# Patient Record
Sex: FEMALE | Race: BLACK OR AFRICAN AMERICAN | Employment: FULL TIME | URBAN - METROPOLITAN AREA
[De-identification: names, ages, dates, MRNs, and addresses within clinical notes are randomized per-mention and may not be internally consistent; named-entity substitution may affect disease eponyms.]

---

## 2023-01-30 ENCOUNTER — HOSPITAL ENCOUNTER (OUTPATIENT)
Age: 48
Setting detail: OBSERVATION
Discharge: HOME | End: 2023-02-01
Attending: EMERGENCY MEDICINE | Admitting: UROLOGY
Payer: COMMERCIAL

## 2023-01-30 DIAGNOSIS — N20.1 RIGHT URETERAL STONE: Primary | ICD-10-CM

## 2023-01-30 DIAGNOSIS — R52 INTRACTABLE PAIN: ICD-10-CM

## 2023-01-30 LAB
ALBUMIN SERPL-MCNC: 3.7 G/DL (ref 3.5–5)
ALBUMIN/GLOB SERPL: 0.8 (ref 0.4–1.6)
ALP SERPL-CCNC: 65 U/L (ref 50–136)
ALT SERPL-CCNC: 36 U/L (ref 12–65)
ANION GAP SERPL CALC-SCNC: 15 MMOL/L (ref 2–11)
AST SERPL-CCNC: 23 U/L (ref 15–37)
BASOPHILS # BLD: 0.1 K/UL (ref 0–0.2)
BASOPHILS NFR BLD: 1 % (ref 0–2)
BILIRUB SERPL-MCNC: 0.4 MG/DL (ref 0.2–1.1)
BILIRUB UR QL: NEGATIVE
BUN SERPL-MCNC: 6 MG/DL (ref 6–23)
CALCIUM SERPL-MCNC: 9.2 MG/DL (ref 8.3–10.4)
CHLORIDE SERPL-SCNC: 104 MMOL/L (ref 101–110)
CO2 SERPL-SCNC: 19 MMOL/L (ref 21–32)
CREAT SERPL-MCNC: 0.9 MG/DL (ref 0.6–1)
DIFFERENTIAL METHOD BLD: NORMAL
EOSINOPHIL # BLD: 0.4 K/UL (ref 0–0.8)
EOSINOPHIL NFR BLD: 5 % (ref 0.5–7.8)
ERYTHROCYTE [DISTWIDTH] IN BLOOD BY AUTOMATED COUNT: 14.6 % (ref 11.9–14.6)
GLOBULIN SER CALC-MCNC: 4.4 G/DL (ref 2.8–4.5)
GLUCOSE SERPL-MCNC: 99 MG/DL (ref 65–100)
GLUCOSE UR QL STRIP.AUTO: NEGATIVE MG/DL
HCG UR QL: NEGATIVE
HCT VFR BLD AUTO: 41.3 % (ref 35.8–46.3)
HGB BLD-MCNC: 13.4 G/DL (ref 11.7–15.4)
IMM GRANULOCYTES # BLD AUTO: 0 K/UL (ref 0–0.5)
IMM GRANULOCYTES NFR BLD AUTO: 0 % (ref 0–5)
KETONES UR-MCNC: NEGATIVE MG/DL
LEUKOCYTE ESTERASE UR QL STRIP: NEGATIVE
LYMPHOCYTES # BLD: 1.2 K/UL (ref 0.5–4.6)
LYMPHOCYTES NFR BLD: 16 % (ref 13–44)
MCH RBC QN AUTO: 29.4 PG (ref 26.1–32.9)
MCHC RBC AUTO-ENTMCNC: 32.4 G/DL (ref 31.4–35)
MCV RBC AUTO: 90.6 FL (ref 82–102)
MONOCYTES # BLD: 0.7 K/UL (ref 0.1–1.3)
MONOCYTES NFR BLD: 9 % (ref 4–12)
NEUTS SEG # BLD: 5.5 K/UL (ref 1.7–8.2)
NEUTS SEG NFR BLD: 70 % (ref 43–78)
NITRITE UR QL: NEGATIVE
NRBC # BLD: 0 K/UL (ref 0–0.2)
PH UR: 6.5 (ref 5–9)
PLATELET # BLD AUTO: 363 K/UL (ref 150–450)
PMV BLD AUTO: 11.3 FL (ref 9.4–12.3)
POTASSIUM SERPL-SCNC: 4 MMOL/L (ref 3.5–5.1)
PROT SERPL-MCNC: 8.1 G/DL (ref 6.3–8.2)
PROT UR QL: NEGATIVE MG/DL
RBC # BLD AUTO: 4.56 M/UL (ref 4.05–5.2)
RBC # UR STRIP: ABNORMAL
SERVICE CMNT-IMP: ABNORMAL
SODIUM SERPL-SCNC: 138 MMOL/L (ref 133–143)
SP GR UR: 1.02 (ref 1–1.02)
UROBILINOGEN UR QL: 0.2 EU/DL (ref 0.2–1)
WBC # BLD AUTO: 7.8 K/UL (ref 4.3–11.1)

## 2023-01-30 PROCEDURE — 85025 COMPLETE CBC W/AUTO DIFF WBC: CPT

## 2023-01-30 PROCEDURE — 81003 URINALYSIS AUTO W/O SCOPE: CPT

## 2023-01-30 PROCEDURE — 96361 HYDRATE IV INFUSION ADD-ON: CPT

## 2023-01-30 PROCEDURE — G0378 HOSPITAL OBSERVATION PER HR: HCPCS

## 2023-01-30 PROCEDURE — 2700000000 HC OXYGEN THERAPY PER DAY

## 2023-01-30 PROCEDURE — 81025 URINE PREGNANCY TEST: CPT

## 2023-01-30 PROCEDURE — 99222 1ST HOSP IP/OBS MODERATE 55: CPT | Performed by: UROLOGY

## 2023-01-30 PROCEDURE — 6360000002 HC RX W HCPCS: Performed by: EMERGENCY MEDICINE

## 2023-01-30 PROCEDURE — 2580000003 HC RX 258: Performed by: EMERGENCY MEDICINE

## 2023-01-30 PROCEDURE — 80053 COMPREHEN METABOLIC PANEL: CPT

## 2023-01-30 PROCEDURE — 96374 THER/PROPH/DIAG INJ IV PUSH: CPT

## 2023-01-30 PROCEDURE — 99285 EMERGENCY DEPT VISIT HI MDM: CPT

## 2023-01-30 PROCEDURE — 96375 TX/PRO/DX INJ NEW DRUG ADDON: CPT

## 2023-01-30 RX ORDER — KETOROLAC TROMETHAMINE 30 MG/ML
30 INJECTION, SOLUTION INTRAMUSCULAR; INTRAVENOUS
Status: COMPLETED | OUTPATIENT
Start: 2023-01-30 | End: 2023-01-30

## 2023-01-30 RX ORDER — SODIUM CHLORIDE 0.9 % (FLUSH) 0.9 %
5-40 SYRINGE (ML) INJECTION EVERY 12 HOURS SCHEDULED
Status: DISCONTINUED | OUTPATIENT
Start: 2023-01-30 | End: 2023-02-01 | Stop reason: HOSPADM

## 2023-01-30 RX ORDER — ONDANSETRON 8 MG/1
4 TABLET, ORALLY DISINTEGRATING ORAL EVERY 8 HOURS PRN
Status: DISCONTINUED | OUTPATIENT
Start: 2023-01-30 | End: 2023-02-01 | Stop reason: HOSPADM

## 2023-01-30 RX ORDER — POLYETHYLENE GLYCOL 3350 17 G/17G
17 POWDER, FOR SOLUTION ORAL DAILY PRN
Status: DISCONTINUED | OUTPATIENT
Start: 2023-01-30 | End: 2023-02-01 | Stop reason: HOSPADM

## 2023-01-30 RX ORDER — ACETAMINOPHEN 325 MG/1
650 TABLET ORAL
Status: ACTIVE | OUTPATIENT
Start: 2023-01-30 | End: 2023-01-31

## 2023-01-30 RX ORDER — SODIUM CHLORIDE 9 MG/ML
INJECTION, SOLUTION INTRAVENOUS CONTINUOUS
Status: DISCONTINUED | OUTPATIENT
Start: 2023-01-30 | End: 2023-02-01 | Stop reason: HOSPADM

## 2023-01-30 RX ORDER — OXYCODONE HYDROCHLORIDE 5 MG/1
5 TABLET ORAL
Status: COMPLETED | OUTPATIENT
Start: 2023-01-30 | End: 2023-01-31

## 2023-01-30 RX ORDER — ONDANSETRON 2 MG/ML
4 INJECTION INTRAMUSCULAR; INTRAVENOUS EVERY 6 HOURS PRN
Status: DISCONTINUED | OUTPATIENT
Start: 2023-01-30 | End: 2023-02-01 | Stop reason: HOSPADM

## 2023-01-30 RX ORDER — 0.9 % SODIUM CHLORIDE 0.9 %
1000 INTRAVENOUS SOLUTION INTRAVENOUS ONCE
Status: COMPLETED | OUTPATIENT
Start: 2023-01-30 | End: 2023-01-30

## 2023-01-30 RX ORDER — SODIUM CHLORIDE 9 MG/ML
INJECTION, SOLUTION INTRAVENOUS PRN
Status: DISCONTINUED | OUTPATIENT
Start: 2023-01-30 | End: 2023-02-01 | Stop reason: HOSPADM

## 2023-01-30 RX ORDER — SODIUM CHLORIDE 0.9 % (FLUSH) 0.9 %
5-40 SYRINGE (ML) INJECTION PRN
Status: DISCONTINUED | OUTPATIENT
Start: 2023-01-30 | End: 2023-02-01 | Stop reason: HOSPADM

## 2023-01-30 RX ORDER — TAMSULOSIN HYDROCHLORIDE 0.4 MG/1
0.4 CAPSULE ORAL DAILY
Status: DISCONTINUED | OUTPATIENT
Start: 2023-01-31 | End: 2023-02-01 | Stop reason: HOSPADM

## 2023-01-30 RX ORDER — ONDANSETRON 2 MG/ML
4 INJECTION INTRAMUSCULAR; INTRAVENOUS
Status: COMPLETED | OUTPATIENT
Start: 2023-01-30 | End: 2023-01-30

## 2023-01-30 RX ADMIN — SODIUM CHLORIDE 1000 ML: 900 INJECTION, SOLUTION INTRAVENOUS at 16:30

## 2023-01-30 RX ADMIN — ONDANSETRON 4 MG: 2 INJECTION INTRAMUSCULAR; INTRAVENOUS at 15:31

## 2023-01-30 RX ADMIN — KETOROLAC TROMETHAMINE 30 MG: 30 INJECTION, SOLUTION INTRAMUSCULAR at 15:31

## 2023-01-30 ASSESSMENT — ENCOUNTER SYMPTOMS
ABDOMINAL PAIN: 1
NAUSEA: 1
CONSTIPATION: 0
SHORTNESS OF BREATH: 0
VOMITING: 0
COUGH: 0
DIARRHEA: 0
RHINORRHEA: 0

## 2023-01-30 ASSESSMENT — PAIN SCALES - GENERAL
PAINLEVEL_OUTOF10: 10
PAINLEVEL_OUTOF10: 0

## 2023-01-30 ASSESSMENT — LIFESTYLE VARIABLES
HOW OFTEN DO YOU HAVE A DRINK CONTAINING ALCOHOL: 2-3 TIMES A WEEK
HOW MANY STANDARD DRINKS CONTAINING ALCOHOL DO YOU HAVE ON A TYPICAL DAY: 1 OR 2

## 2023-01-30 ASSESSMENT — PAIN - FUNCTIONAL ASSESSMENT: PAIN_FUNCTIONAL_ASSESSMENT: 0-10

## 2023-01-30 ASSESSMENT — PAIN DESCRIPTION - DESCRIPTORS: DESCRIPTORS: SORE;SHARP

## 2023-01-30 ASSESSMENT — PAIN DESCRIPTION - LOCATION: LOCATION: FLANK

## 2023-01-30 ASSESSMENT — PAIN DESCRIPTION - ORIENTATION: ORIENTATION: RIGHT

## 2023-01-30 NOTE — ED PROVIDER NOTES
Emergency Department Provider Note                   PCP:                Miesha Bolanos MD               Age: 41 y.o. Sex: female       ICD-10-CM    1. Right ureteral stone  N20.1       2. Intractable pain  R52           DISPOSITION Decision To Admit 01/30/2023 03:02:46 PM        Medical Decision Making  43-year-old female with history of high parathyroidism presents with complaint of worsening left flank pain since last Monday. Patient was recently evaluated and underwent CT renal stone protocol with evidence of 3 mm right UVJ stone. PCP note from today reviewed. Patient with worsening pain. States she has been compliant with her home pain medication without improvement of symptoms. CT scan with evidence of moderately obstructing 3 mm right UVJ stone. Was prescribed for hydration, Zofran, Toradol. Basic labs ordered and pending at this time. Case discussed with urology NP on-call. States that Dr. Demond Plata to evaluate and likely patient will need to be admitted and have stent placement. Problems Addressed:  Intractable pain: acute illness or injury  Right ureteral stone: complicated acute illness or injury    Amount and/or Complexity of Data Reviewed  Labs: ordered. Risk  Prescription drug management. Decision regarding hospitalization. Complexity of Problem: 1 acute complicated illness or injury. (4)    I have conducted an independent ordering and review of Labs. I have conducted an independent ordering and review of CT Scan. I have reviewed records from an external source: provider visit notes from PCP. Considerations: Shared decision making was utilized in the care of this patient. I discussed disposition with another provider. Patient was admitted I have communication with admitting physician.          Orders Placed This Encounter   Procedures    CMP    CBC with Auto Differential    POCT Urine Dipstick    POC Pregnancy Urine Qual    Saline lock IV        Medications   0.9 % sodium chloride bolus (has no administration in time range)   ketorolac (TORADOL) injection 30 mg (has no administration in time range)   ondansetron (ZOFRAN) injection 4 mg (has no administration in time range)       New Prescriptions    No medications on file        Glenn Mitchell is a 41 y.o. female who presents to the Emergency Department with chief complaint of  No chief complaint on file. 44-year-old female with history of high parathyroidism presents with complaint of worsening left flank pain since last Monday. Patient was recently evaluated and underwent CT renal stone protocol with evidence of 3 mm right UVJ stone. Patient was discharged home with pain control, Flomax. Patient followed up with PCP today and was noted to be having increased pain. Patient received Demerol injection for pain control. Patient was instructed to present to ED for further evaluation and urology consultation. Reports nausea. Denies vomiting, chest pain, shortness of breath, cough, vaginal discharge, vaginal bleeding, pelvic pain. States last menstrual cycle was Wednesday. The history is provided by the patient. No  was used. Review of Systems   Constitutional:  Negative for chills, diaphoresis, fatigue and fever. HENT:  Negative for congestion and rhinorrhea. Respiratory:  Negative for cough and shortness of breath. Cardiovascular:  Negative for chest pain. Gastrointestinal:  Positive for abdominal pain and nausea. Negative for constipation, diarrhea and vomiting. Genitourinary:  Positive for dysuria and flank pain. Negative for hematuria, pelvic pain, vaginal bleeding and vaginal discharge. Musculoskeletal:  Negative for arthralgias and myalgias. Skin:  Negative for rash. Neurological:  Negative for dizziness, weakness, light-headedness and headaches. Hematological:  Does not bruise/bleed easily.      Past Medical History:   Diagnosis Date    Acquired hypothyroidism hyperthyroid    Herpes simplex without mention of complication         Past Surgical History:   Procedure Laterality Date     DELIVERY ONLY          Family History   Problem Relation Age of Onset    Diabetes Maternal Grandmother     Cancer Maternal Grandmother     Heart Disease Maternal Grandfather     Hypertension Paternal Grandfather         Social History     Socioeconomic History    Marital status:    Tobacco Use    Smoking status: Never   Substance and Sexual Activity    Alcohol use: No    Drug use: No         Patient has no known allergies. Previous Medications    AMPICILLIN (PRINCIPEN) 500 MG CAPSULE    Take 500 mg by mouth 4 times daily (before meals and nightly)    HYDROCODONE-ACETAMINOPHEN (NORCO) 5-325 MG PER TABLET    Take 1 tablet by mouth every 8 hours as needed for Pain for up to 4 days. Intended supply: 3 days. Take lowest dose possible to manage pain Max Daily Amount: 3 tablets    TAMSULOSIN (FLOMAX) 0.4 MG CAPSULE    Take 0.4 mg by mouth daily    TRAMADOL (ULTRAM) 50 MG TABLET    Take 50 mg by mouth every 8 hours as needed. Vitals signs and nursing note reviewed. No data found. Physical Exam  Vitals and nursing note reviewed. Constitutional:       Appearance: Normal appearance. HENT:      Head: Normocephalic. Nose: Nose normal.      Mouth/Throat:      Mouth: Mucous membranes are moist.   Eyes:      Extraocular Movements: Extraocular movements intact. Pupils: Pupils are equal, round, and reactive to light. Cardiovascular:      Rate and Rhythm: Normal rate. Pulses: Normal pulses. Heart sounds: Normal heart sounds. Pulmonary:      Effort: Pulmonary effort is normal.      Breath sounds: Normal breath sounds. Abdominal:      General: Bowel sounds are normal. There is no distension. Palpations: Abdomen is soft. Tenderness: There is no abdominal tenderness. There is right CVA tenderness. There is no left CVA tenderness or guarding. Comments: Moderate right CVA tenderness. No rebound or guarding. Musculoskeletal:         General: No swelling. Normal range of motion. Skin:     General: Skin is warm. Findings: No erythema or rash. Neurological:      General: No focal deficit present. Mental Status: She is alert and oriented to person, place, and time. Cranial Nerves: No cranial nerve deficit. Motor: No weakness. Procedures    No results found for any visits on 01/30/23. No orders to display                       Voice dictation software was used during the making of this note. This software is not perfect and grammatical and other typographical errors may be present. This note has not been completely proofread for errors.      Andres Enamorado MD  01/30/23 9920

## 2023-01-31 ENCOUNTER — ANESTHESIA EVENT (OUTPATIENT)
Dept: SURGERY | Age: 48
End: 2023-01-31
Payer: COMMERCIAL

## 2023-01-31 ENCOUNTER — ANESTHESIA (OUTPATIENT)
Dept: SURGERY | Age: 48
End: 2023-01-31
Payer: COMMERCIAL

## 2023-01-31 PROCEDURE — 99232 SBSQ HOSP IP/OBS MODERATE 35: CPT | Performed by: NURSE PRACTITIONER

## 2023-01-31 PROCEDURE — 2709999900 HC NON-CHARGEABLE SUPPLY: Performed by: UROLOGY

## 2023-01-31 PROCEDURE — C2617 STENT, NON-COR, TEM W/O DEL: HCPCS | Performed by: UROLOGY

## 2023-01-31 PROCEDURE — 7100000000 HC PACU RECOVERY - FIRST 15 MIN: Performed by: UROLOGY

## 2023-01-31 PROCEDURE — 6370000000 HC RX 637 (ALT 250 FOR IP): Performed by: UROLOGY

## 2023-01-31 PROCEDURE — 3600000004 HC SURGERY LEVEL 4 BASE: Performed by: UROLOGY

## 2023-01-31 PROCEDURE — 2580000003 HC RX 258: Performed by: NURSE ANESTHETIST, CERTIFIED REGISTERED

## 2023-01-31 PROCEDURE — 96376 TX/PRO/DX INJ SAME DRUG ADON: CPT

## 2023-01-31 PROCEDURE — 7100000001 HC PACU RECOVERY - ADDTL 15 MIN: Performed by: UROLOGY

## 2023-01-31 PROCEDURE — 3700000000 HC ANESTHESIA ATTENDED CARE: Performed by: UROLOGY

## 2023-01-31 PROCEDURE — 2500000003 HC RX 250 WO HCPCS: Performed by: UROLOGY

## 2023-01-31 PROCEDURE — 96375 TX/PRO/DX INJ NEW DRUG ADDON: CPT

## 2023-01-31 PROCEDURE — 52356 CYSTO/URETERO W/LITHOTRIPSY: CPT | Performed by: UROLOGY

## 2023-01-31 PROCEDURE — 6360000002 HC RX W HCPCS: Performed by: UROLOGY

## 2023-01-31 PROCEDURE — 2500000003 HC RX 250 WO HCPCS: Performed by: NURSE ANESTHETIST, CERTIFIED REGISTERED

## 2023-01-31 PROCEDURE — G0378 HOSPITAL OBSERVATION PER HR: HCPCS

## 2023-01-31 PROCEDURE — 3700000001 HC ADD 15 MINUTES (ANESTHESIA): Performed by: UROLOGY

## 2023-01-31 PROCEDURE — 6360000002 HC RX W HCPCS: Performed by: NURSE ANESTHETIST, CERTIFIED REGISTERED

## 2023-01-31 PROCEDURE — 3600000014 HC SURGERY LEVEL 4 ADDTL 15MIN: Performed by: UROLOGY

## 2023-01-31 PROCEDURE — C1769 GUIDE WIRE: HCPCS | Performed by: UROLOGY

## 2023-01-31 PROCEDURE — 6370000000 HC RX 637 (ALT 250 FOR IP): Performed by: NURSE PRACTITIONER

## 2023-01-31 PROCEDURE — 2580000003 HC RX 258: Performed by: UROLOGY

## 2023-01-31 PROCEDURE — 2720000010 HC SURG SUPPLY STERILE: Performed by: UROLOGY

## 2023-01-31 DEVICE — URETERAL STENT
Type: IMPLANTABLE DEVICE | Site: URETER | Status: FUNCTIONAL
Brand: PERCUFLEX™ PLUS

## 2023-01-31 RX ORDER — HYDROCODONE BITARTRATE AND ACETAMINOPHEN 5; 325 MG/1; MG/1
1 TABLET ORAL EVERY 6 HOURS PRN
Qty: 12 TABLET | Refills: 0 | Status: SHIPPED | OUTPATIENT
Start: 2023-01-31 | End: 2023-02-03

## 2023-01-31 RX ORDER — PROPOFOL 10 MG/ML
INJECTION, EMULSION INTRAVENOUS PRN
Status: DISCONTINUED | OUTPATIENT
Start: 2023-01-31 | End: 2023-01-31 | Stop reason: SDUPTHER

## 2023-01-31 RX ORDER — DIPHENHYDRAMINE HYDROCHLORIDE 50 MG/ML
12.5 INJECTION INTRAMUSCULAR; INTRAVENOUS
Status: DISCONTINUED | OUTPATIENT
Start: 2023-01-31 | End: 2023-01-31 | Stop reason: HOSPADM

## 2023-01-31 RX ORDER — DEXMEDETOMIDINE HYDROCHLORIDE 100 UG/ML
INJECTION, SOLUTION INTRAVENOUS PRN
Status: DISCONTINUED | OUTPATIENT
Start: 2023-01-31 | End: 2023-01-31 | Stop reason: SDUPTHER

## 2023-01-31 RX ORDER — FENTANYL CITRATE 50 UG/ML
INJECTION, SOLUTION INTRAMUSCULAR; INTRAVENOUS PRN
Status: DISCONTINUED | OUTPATIENT
Start: 2023-01-31 | End: 2023-01-31 | Stop reason: SDUPTHER

## 2023-01-31 RX ORDER — HYDROMORPHONE HCL 110MG/55ML
0.5 PATIENT CONTROLLED ANALGESIA SYRINGE INTRAVENOUS EVERY 5 MIN PRN
Status: DISCONTINUED | OUTPATIENT
Start: 2023-01-31 | End: 2023-01-31 | Stop reason: HOSPADM

## 2023-01-31 RX ORDER — ONDANSETRON 2 MG/ML
INJECTION INTRAMUSCULAR; INTRAVENOUS PRN
Status: DISCONTINUED | OUTPATIENT
Start: 2023-01-31 | End: 2023-01-31 | Stop reason: SDUPTHER

## 2023-01-31 RX ORDER — PHENAZOPYRIDINE HYDROCHLORIDE 200 MG/1
200 TABLET, FILM COATED ORAL 3 TIMES DAILY PRN
Qty: 30 TABLET | Refills: 3 | Status: SHIPPED | OUTPATIENT
Start: 2023-01-31 | End: 2023-02-03

## 2023-01-31 RX ORDER — LIDOCAINE HYDROCHLORIDE 20 MG/ML
INJECTION, SOLUTION EPIDURAL; INFILTRATION; INTRACAUDAL; PERINEURAL PRN
Status: DISCONTINUED | OUTPATIENT
Start: 2023-01-31 | End: 2023-01-31 | Stop reason: SDUPTHER

## 2023-01-31 RX ORDER — OXYCODONE HYDROCHLORIDE 5 MG/1
5 TABLET ORAL
Status: DISCONTINUED | OUTPATIENT
Start: 2023-01-31 | End: 2023-01-31 | Stop reason: HOSPADM

## 2023-01-31 RX ORDER — SODIUM CHLORIDE, SODIUM LACTATE, POTASSIUM CHLORIDE, CALCIUM CHLORIDE 600; 310; 30; 20 MG/100ML; MG/100ML; MG/100ML; MG/100ML
INJECTION, SOLUTION INTRAVENOUS CONTINUOUS PRN
Status: DISCONTINUED | OUTPATIENT
Start: 2023-01-31 | End: 2023-01-31 | Stop reason: SDUPTHER

## 2023-01-31 RX ORDER — ONDANSETRON 2 MG/ML
4 INJECTION INTRAMUSCULAR; INTRAVENOUS
Status: DISCONTINUED | OUTPATIENT
Start: 2023-01-31 | End: 2023-01-31 | Stop reason: HOSPADM

## 2023-01-31 RX ORDER — BUPIVACAINE HYDROCHLORIDE 5 MG/ML
INJECTION, SOLUTION EPIDURAL; INTRACAUDAL PRN
Status: DISCONTINUED | OUTPATIENT
Start: 2023-01-31 | End: 2023-01-31 | Stop reason: HOSPADM

## 2023-01-31 RX ORDER — OXYCODONE HYDROCHLORIDE 5 MG/1
5 TABLET ORAL EVERY 4 HOURS PRN
Status: DISCONTINUED | OUTPATIENT
Start: 2023-01-31 | End: 2023-02-01 | Stop reason: HOSPADM

## 2023-01-31 RX ORDER — DEXAMETHASONE SODIUM PHOSPHATE 4 MG/ML
INJECTION, SOLUTION INTRA-ARTICULAR; INTRALESIONAL; INTRAMUSCULAR; INTRAVENOUS; SOFT TISSUE PRN
Status: DISCONTINUED | OUTPATIENT
Start: 2023-01-31 | End: 2023-01-31 | Stop reason: SDUPTHER

## 2023-01-31 RX ORDER — ZOLPIDEM TARTRATE 5 MG/1
5 TABLET ORAL NIGHTLY PRN
Status: DISCONTINUED | OUTPATIENT
Start: 2023-01-31 | End: 2023-02-01 | Stop reason: HOSPADM

## 2023-01-31 RX ORDER — KETOROLAC TROMETHAMINE 30 MG/ML
INJECTION, SOLUTION INTRAMUSCULAR; INTRAVENOUS PRN
Status: DISCONTINUED | OUTPATIENT
Start: 2023-01-31 | End: 2023-01-31 | Stop reason: SDUPTHER

## 2023-01-31 RX ADMIN — ONDANSETRON 4 MG: 2 INJECTION INTRAMUSCULAR; INTRAVENOUS at 16:26

## 2023-01-31 RX ADMIN — PROPOFOL 200 MG: 10 INJECTION, EMULSION INTRAVENOUS at 16:12

## 2023-01-31 RX ADMIN — SODIUM CHLORIDE: 9 INJECTION, SOLUTION INTRAVENOUS at 05:16

## 2023-01-31 RX ADMIN — HYDROMORPHONE HYDROCHLORIDE 0.5 MG: 1 INJECTION, SOLUTION INTRAMUSCULAR; INTRAVENOUS; SUBCUTANEOUS at 18:46

## 2023-01-31 RX ADMIN — KETOROLAC TROMETHAMINE 30 MG: 30 INJECTION, SOLUTION INTRAMUSCULAR; INTRAVENOUS at 16:45

## 2023-01-31 RX ADMIN — TAMSULOSIN HYDROCHLORIDE 0.4 MG: 0.4 CAPSULE ORAL at 08:05

## 2023-01-31 RX ADMIN — DEXAMETHASONE SODIUM PHOSPHATE 10 MG: 4 INJECTION, SOLUTION INTRAMUSCULAR; INTRAVENOUS at 16:26

## 2023-01-31 RX ADMIN — LIDOCAINE HYDROCHLORIDE 60 MG: 20 INJECTION, SOLUTION EPIDURAL; INFILTRATION; INTRACAUDAL; PERINEURAL at 16:12

## 2023-01-31 RX ADMIN — FENTANYL CITRATE 50 MCG: 50 INJECTION, SOLUTION INTRAMUSCULAR; INTRAVENOUS at 16:10

## 2023-01-31 RX ADMIN — ZOLPIDEM TARTRATE 5 MG: 5 TABLET ORAL at 22:38

## 2023-01-31 RX ADMIN — SODIUM CHLORIDE: 9 INJECTION, SOLUTION INTRAVENOUS at 00:04

## 2023-01-31 RX ADMIN — OXYCODONE HYDROCHLORIDE 5 MG: 5 TABLET ORAL at 22:38

## 2023-01-31 RX ADMIN — FENTANYL CITRATE 25 MCG: 50 INJECTION, SOLUTION INTRAMUSCULAR; INTRAVENOUS at 16:21

## 2023-01-31 RX ADMIN — SODIUM CHLORIDE, PRESERVATIVE FREE 10 ML: 5 INJECTION INTRAVENOUS at 21:18

## 2023-01-31 RX ADMIN — Medication 2000 MG: at 16:18

## 2023-01-31 RX ADMIN — SODIUM CHLORIDE, SODIUM LACTATE, POTASSIUM CHLORIDE, AND CALCIUM CHLORIDE: 600; 310; 30; 20 INJECTION, SOLUTION INTRAVENOUS at 16:05

## 2023-01-31 RX ADMIN — HYDROMORPHONE HYDROCHLORIDE 0.5 MG: 1 INJECTION, SOLUTION INTRAMUSCULAR; INTRAVENOUS; SUBCUTANEOUS at 06:27

## 2023-01-31 RX ADMIN — OXYCODONE HYDROCHLORIDE 5 MG: 5 TABLET ORAL at 05:13

## 2023-01-31 RX ADMIN — DEXMEDETOMIDINE 40 MCG: 100 INJECTION, SOLUTION, CONCENTRATE INTRAVENOUS at 16:17

## 2023-01-31 RX ADMIN — HYDROMORPHONE HYDROCHLORIDE 0.5 MG: 1 INJECTION, SOLUTION INTRAMUSCULAR; INTRAVENOUS; SUBCUTANEOUS at 01:45

## 2023-01-31 RX ADMIN — FENTANYL CITRATE 25 MCG: 50 INJECTION, SOLUTION INTRAMUSCULAR; INTRAVENOUS at 16:25

## 2023-01-31 RX ADMIN — HYDROMORPHONE HYDROCHLORIDE 0.5 MG: 1 INJECTION, SOLUTION INTRAMUSCULAR; INTRAVENOUS; SUBCUTANEOUS at 11:24

## 2023-01-31 RX ADMIN — HYOSCYAMINE SULFATE 125 MCG: 0.12 TABLET ORAL; SUBLINGUAL at 08:05

## 2023-01-31 RX ADMIN — ONDANSETRON 4 MG: 2 INJECTION INTRAMUSCULAR; INTRAVENOUS at 08:37

## 2023-01-31 RX ADMIN — SODIUM CHLORIDE, PRESERVATIVE FREE 10 ML: 5 INJECTION INTRAVENOUS at 00:03

## 2023-01-31 RX ADMIN — OXYCODONE HYDROCHLORIDE 5 MG: 5 TABLET ORAL at 08:05

## 2023-01-31 ASSESSMENT — PAIN SCALES - GENERAL
PAINLEVEL_OUTOF10: 2
PAINLEVEL_OUTOF10: 8
PAINLEVEL_OUTOF10: 9
PAINLEVEL_OUTOF10: 10
PAINLEVEL_OUTOF10: 8
PAINLEVEL_OUTOF10: 6
PAINLEVEL_OUTOF10: 7
PAINLEVEL_OUTOF10: 6
PAINLEVEL_OUTOF10: 1
PAINLEVEL_OUTOF10: 7

## 2023-01-31 ASSESSMENT — PAIN DESCRIPTION - LOCATION
LOCATION: PELVIS
LOCATION: FLANK
LOCATION: ABDOMEN
LOCATION: ABDOMEN;BACK
LOCATION: ABDOMEN
LOCATION: ABDOMEN

## 2023-01-31 ASSESSMENT — PAIN DESCRIPTION - DESCRIPTORS
DESCRIPTORS: ACHING
DESCRIPTORS: ACHING
DESCRIPTORS: CRAMPING;STABBING
DESCRIPTORS: ACHING;CRAMPING
DESCRIPTORS: ACHING
DESCRIPTORS: CRAMPING;ACHING
DESCRIPTORS: ACHING

## 2023-01-31 ASSESSMENT — PAIN - FUNCTIONAL ASSESSMENT
PAIN_FUNCTIONAL_ASSESSMENT: NONE - DENIES PAIN
PAIN_FUNCTIONAL_ASSESSMENT: ACTIVITIES ARE NOT PREVENTED
PAIN_FUNCTIONAL_ASSESSMENT: ADULT NONVERBAL PAIN SCALE (NPVS)
PAIN_FUNCTIONAL_ASSESSMENT: ACTIVITIES ARE NOT PREVENTED
PAIN_FUNCTIONAL_ASSESSMENT: NONE - DENIES PAIN
PAIN_FUNCTIONAL_ASSESSMENT: ACTIVITIES ARE NOT PREVENTED
PAIN_FUNCTIONAL_ASSESSMENT: 0-10

## 2023-01-31 ASSESSMENT — PAIN DESCRIPTION - ORIENTATION
ORIENTATION: RIGHT
ORIENTATION: RIGHT
ORIENTATION: LOWER
ORIENTATION: RIGHT

## 2023-01-31 NOTE — ANESTHESIA PROCEDURE NOTES
Airway  Date/Time: 1/31/2023 4:14 PM  Urgency: elective    Airway not difficult    General Information and Staff    Patient location during procedure: OR  Resident/CRNA: DANIELLE Haynes - CRNA  Performed: resident/CRNA     Indications and Patient Condition  Indications for airway management: anesthesia  Spontaneous ventilation: present  Sedation level: deep  Preoxygenated: yes  Patient position: sniffing  MILS not maintained throughout  Mask difficulty assessment: vent by bag mask    Final Airway Details  Final airway type: supraglottic airway      Successful airway: oropharyngeal  Size 4     Number of attempts at approach: 1  Ventilation between attempts: supraglottic airway  Number of other approaches attempted: 0    no

## 2023-01-31 NOTE — H&P
Urology H&P:         Patient: Marialuisa Mccracken MRN: [id number]  SSN: [id number]    YOB: 1975  Age: 41 y.o. Sex: female      Subjective:      Marialuisa Mccracken is a 41 y.o. female with history of hyperparathyroidism presents with complaint of worsening left flank pain since last Monday. Patient was recently evaluated at [facility] ER  and underwent CT renal stone protocol with evidence of 3 mm right UVJ stone. PCP note from today reviewed. Patient with worsening pain. States she has been compliant with her home pain medication without improvement of symptoms. CT scan with evidence of moderately obstructing 3 mm right UVJ stone. Was prescribed for hydration, Zofran, Toradol. Basic labs ordered and pending at this time. Case discussed with urology NP on-call. States that Dr. Moises Zamudio to evaluate and likely patient will need to be admitted and have stent placement. .     Past Medical History:   Diagnosis Date    Acquired hypothyroidism     hyperthyroid    Herpes simplex without mention of complication      Past Surgical History:   Procedure Laterality Date     DELIVERY ONLY        Family History   Problem Relation Age of Onset    Diabetes Maternal Grandmother     Cancer Maternal Grandmother     Heart Disease Maternal Grandfather     Hypertension Paternal Grandfather      Social History     Tobacco Use    Smoking status: Never    Smokeless tobacco: Not on file   Substance Use Topics    Alcohol use: No      Prior to Admission medications    Medication Sig Start Date End Date Taking? Authorizing Provider   HYDROcodone-acetaminophen (NORCO) 5-325 MG per tablet Take 1 tablet by mouth every 8 hours as needed for Pain for up to 4 days. Intended supply: 3 days. Take lowest dose possible to manage pain Max Daily Amount: 3 tablets 23  Nicolasa Chadwickr, PA   traMADol (ULTRAM) 50 MG tablet Take 50 mg by mouth every 8 hours as needed.  23   Historical Provider, MD   tamsulosin (FLOMAX) 0.4 MG capsule Take 0.4 mg by mouth daily 1/24/23   Historical Provider, MD   ampicillin (PRINCIPEN) 500 MG capsule Take 500 mg by mouth 4 times daily (before meals and nightly)  Patient not taking: Reported on 1/25/2023    Ar Automatic Reconciliation        No Known Allergies    Review of Systems:  A comprehensive review of systems was negative except for that written in the History of Present Illness. Objective:     Vitals:    01/30/23 1504   BP: (!) 149/97   Pulse: 74   Resp: 18   Temp: 99.1 °F (37.3 °C)   TempSrc: Oral   SpO2: 100%   Weight: 170 lb (77.1 kg)   Height: 5' 5\" (1.651 m)          Physical Exam  General   Mental Status - Patient is alert and oriented X3. Build & Nutrition - Well nourished. Chest and Lung Exam   Chest and lung exam reveals  - normal excursion with symmetric chest walls, quiet, even and easy respiratory effort with no use of accessory muscles and on auscultation, normal breath sounds, no adventitious sounds and normal vocal resonance. Cardiovascular   Cardiovascular examination reveals  - normal heart sounds, regular rate and rhythm with no murmurs. Abdomen   Palpation/Percussion: Palpation and Percussion of the abdomen reveal - Non Tender, No Rebound tenderness, No Rigidity (guarding), No hepatosplenomegaly, No Palpable abdominal masses and Soft. Hernia - Bilateral - No Hernia(s) present. Assessment:     3 mm right distal ureteral stone . Unremitting pain not able to be controlled as outpt . Plan: Will admit for IVF, pain and nausea control. If doesn't pass stone with conservative measures, have discussed right ureteroscopy, laser lithotripsy, stent .     Signed By: Richelle Smith MD     January 30, 2023

## 2023-01-31 NOTE — CARE COORDINATION
CM reviewed patient's chart. Per chart review, patient responded to ER in regards to left flank pain/kidney stone. Per chart, patient insured with PCP listed. No PT/OT consults placed/needed at this time. CM anticipates no needs. Please consult CM for any needs that may arise.       01/31/23 6130   Service Assessment   Patient Orientation Alert and Oriented   Cognition Alert   History Provided By Medical Record   Support Systems Spouse/Significant Other   PCP Verified by CM Yes   Prior Functional Level Independent in ADLs/IADLs   Current Functional Level Independent in ADLs/IADLs   Can patient return to prior living arrangement Yes

## 2023-01-31 NOTE — PROGRESS NOTES
TRANSFER - IN REPORT:    Verbal report received from Jeanna aguirre Darryl Lee  being received from Post op for routine post-op      Report consisted of patient's Situation, Background, Assessment and   Recommendations(SBAR). Information from the following report(s) Nurse Handoff Report was reviewed with the receiving nurse. Opportunity for questions and clarification was provided. Assessment completed upon patient's arrival to unit and care assumed.

## 2023-01-31 NOTE — PROGRESS NOTES
Pt is stable with bed in lowest position, wheels locked, and call light within reach. Hourly rounds completed. VSS. IV is patent and dressing is clean, dry, and intact. Pain treated per MAR. PT urine strained throughout the shift. Pt had complaints of bladder pain this am that she feels has worsened since arrival. Report to be given to day shift nurse.

## 2023-01-31 NOTE — BRIEF OP NOTE
Brief Postoperative Note      Patient: Ricky De Los Santos  YOB: 1975  MRN: [id number]    Date of Procedure: 1/31/2023    Pre-Op Diagnosis: Right ureteral stone [N20.1]    Post-Op Diagnosis: Same       Procedure(s):  CYSTOSCOPY, RIGHT URETEROSCOPY, LASER, LITHO AND STENT    Surgeon(s):  Diogenes Dasilva MD    Assistant:  * No surgical staff found *    Anesthesia: General    Estimated Blood Loss (mL): Minimal    Complications: None    Specimens:   * No specimens in log *    Implants:  Implant Name Type Inv.  Item Serial No.  Lot No. LRB No. Used Action   STENT URET 7FR L24CM PERCFLX + HYDR+ FIRM DUROMETER DB - [id number]  Lindy Villanuevarill 7FR L24CM PERCFLX + HYDR+ FIRM DUROMETER DB  [employer]- [id number] Right 1 Implanted         Drains:   Ureteral Drain/Stent 01/31/23 Right Ureter (Active)       Findings: see op note    Electronically signed by Steven Scott MD on 1/31/2023 at 4:59 PM

## 2023-01-31 NOTE — ANESTHESIA PRE PROCEDURE
Department of Anesthesiology  Preprocedure Note       Name:  Mani Gatica   Age:  41 y.o.  :  1975                                          MRN:  [id number]         Date:  2023      Surgeon: Stephanie Anderson):  Christal Baugh., MD    Procedure: Procedure(s):  CYSTOSCOPY, RIGHT URETEROSCOPY, LASER, LITHO AND STENT    Medications prior to admission:   Prior to Admission medications    Medication Sig Start Date End Date Taking? Authorizing Provider   traMADol (ULTRAM) 50 MG tablet Take 50 mg by mouth every 8 hours as needed.  23   Historical Provider, MD   tamsulosin (FLOMAX) 0.4 MG capsule Take 0.4 mg by mouth daily 23   Historical Provider, MD   ampicillin (PRINCIPEN) 500 MG capsule Take 500 mg by mouth 4 times daily (before meals and nightly)  Patient not taking: No sig reported    Ar Automatic Reconciliation       Current medications:    Current Facility-Administered Medications   Medication Dose Route Frequency Provider Last Rate Last Admin    hyoscyamine (LEVSIN/SL) sublingual tablet 125 mcg  125 mcg SubLINGual Q4H PRN Denise Cha Wall, NP-C   125 mcg at 23 0805    oxyCODONE (ROXICODONE) immediate release tablet 5 mg  5 mg Oral Q4H PRN Denise Cha Wall, NP-C   5 mg at 23 0805    tamsulosin (FLOMAX) capsule 0.4 mg  0.4 mg Oral Daily Christal Seen., MD   0.4 mg at 23 0805    0.9 % sodium chloride infusion   IntraVENous Continuous Christal Seen.,  mL/hr at 23 0516 New Bag at 23 0516    sodium chloride flush 0.9 % injection 5-40 mL  5-40 mL IntraVENous 2 times per day Christal Seen., MD   10 mL at 23 0003    sodium chloride flush 0.9 % injection 5-40 mL  5-40 mL IntraVENous PRN Christal Seen., MD        0.9 % sodium chloride infusion   IntraVENous PRN Christal Seen., MD        ondansetron (ZOFRAN-ODT) disintegrating tablet 4 mg  4 mg Oral Q8H PRN Christal Seen., MD        Or    ondansetron [facility]) injection 4 mg  4 mg IntraVENous Q6H PRN Cindy Mirza MD   4 mg at 23 4538    polyethylene glycol (GLYCOLAX) packet 17 g  17 g Oral Daily PRN Cindy Mirza MD        acetaminophen (TYLENOL) tablet 650 mg  650 mg Oral Once PRN Cindy Mirza MD        HYDROmorphone (DILAUDID) injection 0.5 mg  0.5 mg IntraVENous Q3H PRN Cindy Mirza MD   0.5 mg at 23 1124       Allergies:  No Known Allergies    Problem List:    Patient Active Problem List   Diagnosis Code    Right ureteral stone N20.1       Past Medical History:        Diagnosis Date    Acquired hypothyroidism     hyperthyroid    Herpes simplex without mention of complication        Past Surgical History:        Procedure Laterality Date     DELIVERY ONLY         Social History:    Social History     Tobacco Use    Smoking status: Never    Smokeless tobacco: Not on file   Substance Use Topics    Alcohol use: No                                Counseling given: Not Answered      Vital Signs (Current):   Vitals:    23 1124 23 1125 23 1154 23 1522   BP:  (!) 143/62  133/84   Pulse:  89  84   Resp: 18 16 16 16   Temp:  98.6 °F (37 °C)  98.4 °F (36.9 °C)   TempSrc:  Oral     SpO2:  100%  100%   Weight:       Height:                                                  BP Readings from Last 3 Encounters:   23 133/84   23 (!) 162/79       NPO Status:                                                                                 BMI:   Wt Readings from Last 3 Encounters:   23 170 lb (77.1 kg)   23 176 lb (79.8 kg)     Body mass index is 28.29 kg/m².     CBC:   Lab Results   Component Value Date/Time    WBC 7.8 2023 03:31 PM    RBC 4.56 2023 03:31 PM    HGB 13.4 2023 03:31 PM    HCT 41.3 2023 03:31 PM    MCV 90.6 2023 03:31 PM    RDW 14.6 2023 03:31 PM     2023 03:31 PM       CMP:   Lab Results   Component Value Date/Time     2023 03:31 PM K 4.0 01/30/2023 03:31 PM     01/30/2023 03:31 PM    CO2 19 01/30/2023 03:31 PM    BUN 6 01/30/2023 03:31 PM    CREATININE 0.90 01/30/2023 03:31 PM    LABGLOM >60 01/30/2023 03:31 PM    GLUCOSE 99 01/30/2023 03:31 PM    PROT 8.1 01/30/2023 03:31 PM    CALCIUM 9.2 01/30/2023 03:31 PM    BILITOT 0.4 01/30/2023 03:31 PM    ALKPHOS 65 01/30/2023 03:31 PM    AST 23 01/30/2023 03:31 PM    ALT 36 01/30/2023 03:31 PM       POC Tests: No results for input(s): POCGLU, POCNA, POCK, POCCL, POCBUN, POCHEMO, POCHCT in the last 72 hours. Coags: No results found for: PROTIME, INR, APTT    HCG (If Applicable):   Lab Results   Component Value Date    PREGTESTUR Negative 01/30/2023        ABGs: No results found for: PHART, PO2ART, YPA3OQS, LVQ6QWS, BEART, X8WJPUND     Type & Screen (If Applicable):  No results found for: LABABO, LABRH    Drug/Infectious Status (If Applicable):  No results found for: HIV, HEPCAB    COVID-19 Screening (If Applicable): No results found for: COVID19        Anesthesia Evaluation  Patient summary reviewed and Nursing notes reviewed  Airway: Mallampati: II  TM distance: >3 FB   Neck ROM: full  Mouth opening: > = 3 FB   Dental: normal exam         Pulmonary:Negative Pulmonary ROS and normal exam  breath sounds clear to auscultation                             Cardiovascular:Negative CV ROS  Exercise tolerance: good (>4 METS),           Rhythm: regular  Rate: normal                    Neuro/Psych:   Negative Neuro/Psych ROS              GI/Hepatic/Renal: Neg GI/Hepatic/Renal ROS            Endo/Other:    (+) hypothyroidism, hyperthyroidism::., .                 Abdominal:             Vascular: negative vascular ROS. Other Findings:           Anesthesia Plan      general     ASA 2       Induction: intravenous. Anesthetic plan and risks discussed with patient.                         Rosalie Lim MD   1/31/2023

## 2023-01-31 NOTE — PROGRESS NOTES
Admit Date: 1/30/2023      Subjective:     Niecy pereira with history of hyperparathyroidism presents with complaint of worsening left flank pain since last Monday. Patient was recently evaluated at [facility] ER  and underwent CT renal stone protocol with evidence of 3 mm right UVJ stone. PCP note from today reviewed. Patient with worsening pain. States she has been compliant with her home pain medication without improvement of symptoms. CT scan with evidence of moderately obstructing 3 mm right UVJ stone. Was prescribed for hydration, Zofran, Toradol. 1/31  Complains of pain 8/10 to right flank and bladder. Denies fevers, nausea or vomiting. Objective:     Patient Vitals for the past 8 hrs:   BP Temp Temp src Pulse Resp SpO2   01/31/23 0805 -- -- -- -- 18 --   01/31/23 0735 (!) 141/88 98.4 °F (36.9 °C) Oral 85 16 100 %   01/31/23 0627 -- -- -- -- 19 --   01/31/23 0543 -- -- -- -- 18 --   01/31/23 0513 -- -- -- -- 16 --   01/31/23 0348 130/73 97.5 °F (36.4 °C) Oral 72 16 100 %   01/31/23 0215 -- -- -- -- 18 --   01/31/23 0145 -- -- -- -- 18 --     01/31 0701 - 01/31 1900  In: 1312.5 [P.O.:100; I.V.:1212.5]  Out: 100 [Urine:100]  01/29 1901 - 01/31 0700  In: -   Out: 550 [Urine:550]    Physical Exam:  GENERAL ASSESSMENT: alert, oriented to person, place and time, no acute distress and no anxiety, depression or agitation  Chest: clear to auscultation, no wheezes, rales or rhonchi, symmetric air entry. CVS exam: normal rate, regular rhythm, normal S1, S2, no murmurs, rubs, clicks or gallops. ABDOMEN: tender, soft  Neurological exam reveals alert, oriented, normal speech, no focal findings or movement disorder noted.   FEMALE GENITOURINARY EXAM: not done  MALE GENITAL EXAM: not done    Data Review   Recent Results (from the past 24 hour(s))   CMP    Collection Time: 01/30/23  3:31 PM   Result Value Ref Range    Sodium 138 133 - 143 mmol/L    Potassium 4.0 3.5 - 5.1 mmol/L    Chloride 104 101 - 110 mmol/L CO2 19 (L) 21 - 32 mmol/L    Anion Gap 15 (H) 2 - 11 mmol/L    Glucose 99 65 - 100 mg/dL    BUN 6 6 - 23 MG/DL    Creatinine 0.90 0.6 - 1.0 MG/DL    Est, Glom Filt Rate >60 >60 ml/min/1.73m2    Calcium 9.2 8.3 - 10.4 MG/DL    Total Bilirubin 0.4 0.2 - 1.1 MG/DL    ALT 36 12 - 65 U/L    AST 23 15 - 37 U/L    Alk Phosphatase 65 50 - 136 U/L    Total Protein 8.1 6.3 - 8.2 g/dL    Albumin 3.7 3.5 - 5.0 g/dL    Globulin 4.4 2.8 - 4.5 g/dL    Albumin/Globulin Ratio 0.8 0.4 - 1.6     CBC with Auto Differential    Collection Time: 01/30/23  3:31 PM   Result Value Ref Range    WBC 7.8 4.3 - 11.1 K/uL    RBC 4.56 4.05 - 5.2 M/uL    Hemoglobin 13.4 11.7 - 15.4 g/dL    Hematocrit 41.3 35.8 - 46.3 %    MCV 90.6 82 - 102 FL    MCH 29.4 26.1 - 32.9 PG    MCHC 32.4 31.4 - 35.0 g/dL    RDW 14.6 11.9 - 14.6 %    Platelets 732 312 - 676 K/uL    MPV 11.3 9.4 - 12.3 FL    nRBC 0.00 0.0 - 0.2 K/uL    Differential Type AUTOMATED      Seg Neutrophils 70 43 - 78 %    Lymphocytes 16 13 - 44 %    Monocytes 9 4.0 - 12.0 %    Eosinophils % 5 0.5 - 7.8 %    Basophils 1 0.0 - 2.0 %    Immature Granulocytes 0 0.0 - 5.0 %    Segs Absolute 5.5 1.7 - 8.2 K/UL    Absolute Lymph # 1.2 0.5 - 4.6 K/UL    Absolute Mono # 0.7 0.1 - 1.3 K/UL    Absolute Eos # 0.4 0.0 - 0.8 K/UL    Basophils Absolute 0.1 0.0 - 0.2 K/UL    Absolute Immature Granulocyte 0.0 0.0 - 0.5 K/UL   POCT Urinalysis no Micro    Collection Time: 01/30/23  4:42 PM   Result Value Ref Range    Specific Gravity, Urine, POC 1.025 (H) 1.001 - 1.023      pH, Urine, POC 6.5 5.0 - 9.0      Protein, Urine, POC Negative NEG mg/dL    Glucose, UA POC Negative NEG mg/dL    Ketones, Urine, POC Negative NEG mg/dL    Bilirubin, Urine, POC Negative NEG      Blood, UA POC Trace Intact (A) NEG      URINE UROBILINOGEN POC 0.2 0.2 - 1.0 EU/dL    Nitrate, Urine, POC Negative NEG      Leukocyte Est, UA POC Negative NEG      Performed by: Iris Fagan    POC Pregnancy Urine Qual    Collection Time: 01/30/23 4:49 PM   Result Value Ref Range    Preg Test, Ur Negative NEG         Assessment:     Principal Problem:    Right ureteral stone  Resolved Problems:    * No resolved hospital problems. *      VSS, CR. 0.90, UA neg. CT ABDOMEN PELVIS RENAL STONE  Moderately obstructing 3 mm right UVJ stone. Plan:   Continue IV hydration. Strain urine. Add levsin for bladder spasms. Add PO Roxicodone for pain control. Will schedule right cysto/ureteroscopy lithotripsy today with Dr. Amber Yun. Patient has been NPO. Signed By: JOSE MARTIN Kaiser     January 31, 2023      [facility]  Still having pain requiring narcotics to manage. Plan right URS, laser lithotripsy, stent today. Risks of bleeding, infection, stricture, retained fragments discussed.

## 2023-01-31 NOTE — ANESTHESIA POSTPROCEDURE EVALUATION
Department of Anesthesiology  Postprocedure Note    Patient: Johanna Dumont  MRN: [id number]  YOB: 1975  Date of evaluation: 1/31/2023      Procedure Summary     Date: 01/31/23 Room / Location: [facility] MAIN OR 01 CYSTO / [facility] MAIN OR    Anesthesia Start: 1605 Anesthesia Stop: 0565    Procedure: CYSTOSCOPY, RIGHT URETEROSCOPY, LASER, LITHO AND STENT (Right: Ureter) Diagnosis:       Right ureteral stone      (Right ureteral stone [N20.1])    Providers: Angela Chinchilla MD Responsible Provider: Finesse Dwyer MD    Anesthesia Type: general ASA Status: 2          Anesthesia Type: No value filed.     Ruby Phase I: Ruby Score: 8    Ruby Phase II:        Anesthesia Post Evaluation    Patient location during evaluation: PACU  Patient participation: complete - patient participated  Level of consciousness: awake and alert  Airway patency: patent  Nausea & Vomiting: no nausea and no vomiting  Complications: no  Cardiovascular status: hemodynamically stable  Respiratory status: acceptable, nonlabored ventilation and spontaneous ventilation  Hydration status: euvolemic  Comments: BP (!) 103/59   Pulse 73   Temp 98.3 °F (36.8 °C) (Tympanic)   Resp 15   Ht 5' 5\" (1.651 m)   Wt 170 lb (77.1 kg)   SpO2 100%   BMI 28.29 kg/m²     Multimodal analgesia pain management approach

## 2023-01-31 NOTE — PROGRESS NOTES
Patient awake, alert and oriented to person, place, time and situation. Vitals signs are stable. Hourly rounds completed during this shift. Patient complains of abdomen pain. Patient medicated as ordered. Patient returned from OR. IV fluids restarted. Family member remains at the bedside. Bed in low position and wheels locked, safety precautions maintained, call bell in reach and personal items are in reach. Will continue to monitor patient. Report to be given to night shift nurse.

## 2023-01-31 NOTE — PERIOP NOTE
TRANSFER - OUT REPORT:    Verbal report given to [email or web address] on Mani Gatica  being transferred to [address] [place] [address] for routine progression of patient care       Report consisted of patients Situation, Background, Assessment and   Recommendations(SBAR). Information from the following report(s) Nurse Handoff Report, Surgery Report, Intake/Output, MAR, Recent Results, and Cardiac Rhythm NSR  was reviewed with the receiving nurse. Lines:   Peripheral IV 01/31/23 Right;Lateral Wrist (Active)        Opportunity for questions and clarification was provided. Patient transported with:   O2 @ 2 liters  Tech    VTE prophylaxis orders have been written for Mani Gatica. Patient and family given floor number and nurses name. Family updated re: pt status after security code verified.

## 2023-01-31 NOTE — PROGRESS NOTES
Attempted Spiritual Consult for [facility], [facility]. POA. [address] [place] [address] reviewed chart to deterimine PT's ability to make decisions. [address] [place] [address] attempted to visit PT. PT was being transported for surgery as [address] [place] [address] arrived. [address] [place] [address] read in chart that PT is Tj. [address] [place] [address] prayed silently for PT, PT's Family, and Staff. Rev. CORTNEY QuezadaDiv.

## 2023-01-31 NOTE — ED NOTES
TRANSFER - OUT REPORT:    Verbal report given to Carolyn Mayberry RN on Adam No  being transferred to [phone] for routine progression of patient care       Report consisted of patient's Situation, Background, Assessment and   Recommendations(SBAR). Information from the following report(s) ED SBAR was reviewed with the receiving nurse. [place] Assessment: Presents to emergency department  because of falls (Syncope, seizure, or loss of consciousness): No, Age > 79: No, Altered Mental Status, Intoxication with alcohol or substance confusion (Disorientation, impaired judgment, poor safety awaremess, or inability to follow instructions): No, Impaired Mobility: Ambulates or transfers with assistive devices or assistance; Unable to ambulate or transer.: No, Nursing Judgement: Yes  Lines:   Peripheral IV 01/30/23 Right Antecubital (Active)       Peripheral IV 01/30/23 Right Antecubital (Active)        Opportunity for questions and clarification was provided.       Patient transported with:  Calvin Weiss RN  01/30/23 5554

## 2023-02-01 VITALS
TEMPERATURE: 98.4 F | HEART RATE: 68 BPM | BODY MASS INDEX: 28.32 KG/M2 | HEIGHT: 65 IN | RESPIRATION RATE: 18 BRPM | SYSTOLIC BLOOD PRESSURE: 131 MMHG | DIASTOLIC BLOOD PRESSURE: 77 MMHG | OXYGEN SATURATION: 98 % | WEIGHT: 170 LBS

## 2023-02-01 PROBLEM — N20.1 RIGHT URETERAL STONE: Status: RESOLVED | Noted: 2023-01-30 | Resolved: 2023-02-01

## 2023-02-01 PROCEDURE — 99231 SBSQ HOSP IP/OBS SF/LOW 25: CPT | Performed by: NURSE PRACTITIONER

## 2023-02-01 PROCEDURE — 6370000000 HC RX 637 (ALT 250 FOR IP): Performed by: UROLOGY

## 2023-02-01 PROCEDURE — G0378 HOSPITAL OBSERVATION PER HR: HCPCS

## 2023-02-01 PROCEDURE — 2580000003 HC RX 258: Performed by: UROLOGY

## 2023-02-01 PROCEDURE — 6360000002 HC RX W HCPCS: Performed by: UROLOGY

## 2023-02-01 RX ADMIN — HYDROMORPHONE HYDROCHLORIDE 0.5 MG: 1 INJECTION, SOLUTION INTRAMUSCULAR; INTRAVENOUS; SUBCUTANEOUS at 08:37

## 2023-02-01 RX ADMIN — SODIUM CHLORIDE, PRESERVATIVE FREE 10 ML: 5 INJECTION INTRAVENOUS at 08:38

## 2023-02-01 RX ADMIN — TAMSULOSIN HYDROCHLORIDE 0.4 MG: 0.4 CAPSULE ORAL at 08:37

## 2023-02-01 RX ADMIN — POLYETHYLENE GLYCOL 3350 17 G: 17 POWDER, FOR SOLUTION ORAL at 04:29

## 2023-02-01 ASSESSMENT — PAIN DESCRIPTION - LOCATION: LOCATION: ABDOMEN

## 2023-02-01 ASSESSMENT — PAIN SCALES - GENERAL
PAINLEVEL_OUTOF10: 3
PAINLEVEL_OUTOF10: 7

## 2023-02-01 ASSESSMENT — PAIN DESCRIPTION - DESCRIPTORS: DESCRIPTORS: ACHING

## 2023-02-01 ASSESSMENT — PAIN - FUNCTIONAL ASSESSMENT: PAIN_FUNCTIONAL_ASSESSMENT: ACTIVITIES ARE NOT PREVENTED

## 2023-02-01 ASSESSMENT — PAIN DESCRIPTION - ORIENTATION: ORIENTATION: LOWER

## 2023-02-01 NOTE — PROGRESS NOTES
Pt to be d/c per MD order. Pt educated on d/c, verbalizes understanding. PIV removed without complication. Yes

## 2023-02-01 NOTE — OP NOTE
[facility]  OPERATIVE REPORT    Name:  Isidro Gillespie  MR#:  [id number]  :  1975  ACCOUNT #:  [de-identified]  DATE OF SERVICE:  2023    PREOPERATIVE DIAGNOSIS:  Right ureteral calculus. POSTOPERATIVE DIAGNOSIS:  Right ureteral calculus. PROCEDURE PERFORMED:  Right ureteroscopy with holmium laser lithotripsy of ureteral calculus and placement of right double-J ureteral stent. SURGEON:  Hipolito Fenton MD    ASSISTANT:  None. ANESTHESIA:  General.    COMPLICATIONS:  None. SPECIMENS REMOVED:  None. IMPLANTS:  Right ureteral stent. ESTIMATED BLOOD LOSS:  None. FINDINGS:  A 3 mm stone impacted in the right intramural ureter. BRIEF DESCRIPTION OF PROCEDURE:  The patient was given a general anesthetic, placed in dorsal lithotomy position. She was prepped and draped in a sterile fashion. I passed a 22-Citizen of the [place] cystoscope into the urethra using the video camera and 30-degree lens. The bladder showed no mucosal lesions. There was edema surrounding the right ureteral orifice. I passed a 0.038 floppy-tip guidewire into the right ureter and passed it up into the kidney using fluoroscopic guidance. The 6.5-Citizen of the [place] semi-rigid ureteroscope was then passed into the ureter. There was a 3 mm stone in the right intramural ureter. A 365 micron holmium laser fiber was used at a setting of 0.5 J and 5 Hz. The stone was completely fragmented. The ureter was very hydronephrotic, and I passed the scope up into the proximal ureter without any further stones being noted. At this point, the wire was backloaded into the cystoscope and we passed a 7-Citizen of the [place] 24 cm long double-J stent successfully and left it in a good position. Suture was left attached to the distal end of the stent. This was left protruding from the urethral meatus, and we instilled Marcaine into the bladder prior to removing the scope. PLAN:  She will be discharged home.   Return to the office in one week for KUB and stent removal.      MD JOSUÉ [id number]  D:  01/31/2023 17:09  T:  01/31/2023 19:19  JOB #:  [id number]

## 2023-02-01 NOTE — DISCHARGE SUMMARY
Discharge Summary    Date: 2/1/2023  Patient Name: Marii Gold    YOB: 1975     Age: 41 y.o. Admit Date: 1/30/2023  Discharge Date: 2/1/2023  Discharge Condition: Stable    Admission Diagnosis  Ureteral stone [N20.1]; Right ureteral stone [N20.1]; Intractable pain [R52]      Discharge Diagnosis  Principal Problem (Resolved):    Right ureteral stone  Active Problems:    * No active hospital problems. [facility] Stay  Narrative of Hospital Course:  Patient Active Problem List:  (none) - all problems resolved or deleted       Consultants:  IP CONSULT TO [facility]    Surgeries/procedures Performed:  Right ureteroscopy lithotripsy stent placement. Treatments:    Analgesia and Surgery    Dilaudid    Discharge Plan/Disposition:  Home    Hospital/Incidental Findings Requiring Follow Up:    Patient Instructions:    Diet:    Activity:Activity as Tolerated and No Driving While on Analgesics  For number of days (if applicable): Other Instructions:    Provider Follow-Up:   No follow-ups on file.      Significant Diagnostic Studies:    Recent Labs:  Admission on 01/30/2023  Sodium                                        Date: 01/30/2023  Value: 138         Ref range: 133 - 143 mmol/L   Status: Final  Potassium                                     Date: 01/30/2023  Value: 4.0         Ref range: 3.5 - 5.1 mmol/L   Status: Final  Chloride                                      Date: 01/30/2023  Value: 104         Ref range: 101 - 110 mmol/L   Status: Final  CO2                                           Date: 01/30/2023  Value: 19 (A)      Ref range: 21 - 32 mmol/L     Status: Final  Anion Gap                                     Date: 01/30/2023  Value: 15 (A)      Ref range: 2 - 11 mmol/L      Status: Final  Glucose                                       Date: 01/30/2023  Value: 99          Ref range: 65 - 100 mg/dL     Status: Final  BUN                                           Date: 01/30/2023  Value: 6           Ref range: 6 - 23 MG/DL       Status: Final  Creatinine                                    Date: 01/30/2023  Value: 0.90        Ref range: 0.6 - 1.0 MG/DL    Status: Final  EstTree Filt Rate                           Date: 01/30/2023  Value: >60         Ref range: >60 ml/min/1.73m2  Status: Final                Comment:      Pediatric calculator link: [employer]. org/professionals/kdoqi/gfr_calculatorped       These results are not intended for use in patients <25years of age. eGFR results are calculated without a race factor using  the 2021 CKD-EPI equation. Careful clinical correlation is recommended, particularly when comparing to results calculated using previous equations. The CKD-EPI equation is less accurate in patients with extremes of muscle mass, extra-renal metabolism of creatinine, excessive creatine ingestion, or following therapy that affects renal tubular secretion.     Calcium                                       Date: 01/30/2023  Value: 9.2         Ref range: 8.3 - 10.4 MG/DL   Status: Final  Total Bilirubin                               Date: 01/30/2023  Value: 0.4         Ref range: 0.2 - 1.1 MG/DL    Status: Final  ALT                                           Date: 01/30/2023  Value: 36          Ref range: 12 - 65 U/L        Status: Final  AST                                           Date: 01/30/2023  Value: 23          Ref range: 15 - 37 U/L        Status: Final  Alk Phosphatase                               Date: 01/30/2023  Value: 65          Ref range: 50 - 136 U/L       Status: Final  Total Protein                                 Date: 01/30/2023  Value: 8.1         Ref range: 6.3 - 8.2 g/dL     Status: Final  Albumin                                       Date: 01/30/2023  Value: 3.7         Ref range: 3.5 - 5.0 g/dL     Status: Final  Globulin                                      Date: 01/30/2023  Value: 4.4         Ref range: 2.8 - 4.5 g/dL Status: Final  Albumin/Globulin Ratio                        Date: 01/30/2023  Value: 0.8         Ref range: 0.4 - 1.6          Status: Final  WBC                                           Date: 01/30/2023  Value: 7.8         Ref range: 4.3 - 11.1 K/uL    Status: Final  RBC                                           Date: 01/30/2023  Value: 4.56        Ref range: 4.05 - 5.2 M/uL    Status: Final  Hemoglobin                                    Date: 01/30/2023  Value: 13.4        Ref range: 11.7 - 15.4 g/dL   Status: Final  Hematocrit                                    Date: 01/30/2023  Value: 41.3        Ref range: 35.8 - 46.3 %      Status: Final  MCV                                           Date: 01/30/2023  Value: 90.6        Ref range: 82 - 102 FL        Status: Final  MCH                                           Date: 01/30/2023  Value: 29.4        Ref range: 26.1 - 32.9 PG     Status: Final  MCHC                                          Date: 01/30/2023  Value: 32.4        Ref range: 31.4 - 35.0 g/dL   Status: Final  RDW                                           Date: 01/30/2023  Value: 14.6        Ref range: 11.9 - 14.6 %      Status: Final  Platelets                                     Date: 01/30/2023  Value: 363         Ref range: 150 - 450 K/uL     Status: Final  MPV                                           Date: 01/30/2023  Value: 11.3        Ref range: 9.4 - 12.3 FL      Status: Final  nRBC                                          Date: 01/30/2023  Value: 0.00        Ref range: 0.0 - 0.2 K/uL     Status: Final                Comment: **Note: Absolute NRBC parameter is now reported with Hemogram**  Differential Type                             Date: 01/30/2023  Value: AUTOMATED   Ref range:                    Status: Final  Seg Neutrophils                               Date: 01/30/2023  Value: 70          Ref range: 43 - 78 %          Status: Final  Lymphocytes Date: 01/30/2023  Value: 16          Ref range: 13 - 44 %          Status: Final  Monocytes                                     Date: 01/30/2023  Value: 9           Ref range: 4.0 - 12.0 %       Status: Final  Eosinophils %                                 Date: 01/30/2023  Value: 5           Ref range: 0.5 - 7.8 %        Status: Final  Basophils                                     Date: 01/30/2023  Value: 1           Ref range: 0.0 - 2.0 %        Status: Final  Immature Granulocytes                         Date: 01/30/2023  Value: 0           Ref range: 0.0 - 5.0 %        Status: Final  Segs Absolute                                 Date: 01/30/2023  Value: 5.5         Ref range: 1.7 - 8.2 K/UL     Status: Final  Absolute Lymph #                              Date: 01/30/2023  Value: 1.2         Ref range: 0.5 - 4.6 K/UL     Status: Final  Absolute Mono #                               Date: 01/30/2023  Value: 0.7         Ref range: 0.1 - 1.3 K/UL     Status: Final  Absolute Eos #                                Date: 01/30/2023  Value: 0.4         Ref range: 0.0 - 0.8 K/UL     Status: Final  Basophils Absolute                            Date: 01/30/2023  Value: 0.1         Ref range: 0.0 - 0.2 K/UL     Status: Final  Absolute Immature Granulocyte                 Date: 01/30/2023  Value: 0.0         Ref range: 0.0 - 0.5 K/UL     Status: Final  Specific Gravity, Urine, POC                  Date: 01/30/2023  Value: 1.025 (A)   Ref range: 1.001 - 1.023      Status: Final  pH, Urine, POC                                Date: 01/30/2023  Value: 6.5         Ref range: 5.0 - 9.0          Status: Final  Protein, Urine, POC                           Date: 01/30/2023  Value: Negative    Ref range: NEG mg/dL          Status: Final  Glucose, UA POC                               Date: 01/30/2023  Value: Negative    Ref range: NEG mg/dL          Status: Final  Ketones, Urine, POC                           Date: 01/30/2023  Value: Negative    Ref range: NEG mg/dL          Status: Final  Bilirubin, Urine, POC                         Date: 01/30/2023  Value: Negative    Ref range: NEG                Status: Final  Blood, UA POC                                 Date: 01/30/2023  Value: Trace Intact                     Ref range: NEG                Status: Final  URINE UROBILINOGEN POC                        Date: 01/30/2023  Value: 0.2         Ref range: 0.2 - 1.0 EU/dL    Status: Final  Nitrate, Urine, POC                           Date: 01/30/2023  Value: Negative    Ref range: NEG                Status: Final  Leukocyte Est, UA POC                         Date: 01/30/2023  Value: Negative    Ref range: NEG                Status: Final  Performed by:                                 Date: 01/30/2023  Value: Earnie Bun                       Status: Final  Preg Test, Ur                                 Date: 01/30/2023  Value: Negative    Ref range: NEG                Status: Final  ------------    Radiology last 7 days:  CT ABDOMEN PELVIS RENAL STONE    Result Date: 1/26/2023  Moderately obstructing 3 mm right UVJ stone. [unfilled]    Discharge Medications    Current Discharge Medication List    START taking these medications    phenazopyridine (PYRIDIUM) 200 MG tablet  Take 1 tablet by mouth 3 times daily as needed for Pain  Qty: 30 tablet Refills: 3          Current Discharge Medication List    CONTINUE these medications which have CHANGED    HYDROcodone-acetaminophen (NORCO) 5-325 MG per tablet  Take 1 tablet by mouth every 6 hours as needed for Pain for up to 3 days. Intended supply: 3 days.  Take lowest dose possible to manage pain Max Daily Amount: 4 tablets  Qty: 12 tablet Refills: 0  Comments: Reduce doses taken as pain becomes manageable  Associated Diagnoses:Right ureteral stone          Current Discharge Medication List    CONTINUE these medications which have NOT CHANGED    traMADol (ULTRAM) 50 MG tablet  Take 50 mg by mouth every 8 hours as needed. tamsulosin (FLOMAX) 0.4 MG capsule  Take 0.4 mg by mouth daily    ampicillin (PRINCIPEN) 500 MG capsule  Take 500 mg by mouth 4 times daily (before meals and nightly)          Current Discharge Medication List        Time Spent on Discharge:  minutes were spent in patient examination, evaluation, counseling as well as medication reconciliation, prescriptions for required medications, discharge plan, and follow up.     Electronically signed by JOSE MARTIN Hanson on 2/1/23 at 8:21 AM EST

## 2023-02-01 NOTE — PROGRESS NOTES
Admit Date: 1/30/2023      Subjective:     Omar Hawkins is POD  1Procedure(s):  CYSTOSCOPY, RIGHT URETEROSCOPY, LASER, LITHO AND STENT    Reports feeling much better. Minimal pain. Reports some bladder spasms. Objective:     Patient Vitals for the past 8 hrs:   BP Temp Temp src Pulse Resp SpO2   02/01/23 0749 131/77 98.4 °F (36.9 °C) Oral 68 17 98 %   02/01/23 0344 (!) 117/59 97.3 °F (36.3 °C) Oral 77 16 96 %     No intake/output data recorded. 01/30 1901 - 02/01 0700  In: 2112.5 [P.O.:100; I.V.:2012.5]  Out: 2700 [Urine:2700]    Physical Exam:  GENERAL ASSESSMENT: alert, oriented to person, place and time, no acute distress and no anxiety, depression or agitation  Chest: clear to auscultation, no wheezes, rales or rhonchi, symmetric air entry. CVS exam: normal rate, regular rhythm, normal S1, S2, no murmurs, rubs, clicks or gallops. ABDOMEN: not done  Neurological exam reveals alert, oriented, normal speech, no focal findings or movement disorder noted. FEMALE GENITOURINARY EXAM: not done  MALE GENITAL EXAM: not done    Data Review No results found for this or any previous visit (from the past 24 hour(s)). Assessment:     Principal Problem:    Right ureteral stone  Resolved Problems:    * No resolved hospital problems. *    VSS, Cr. 0.9. Voiding spontaneously. Pre-Op Diagnosis: Right ureteral stone [N20.1]    Post-Op Diagnosis:  * No post-op diagnosis entered *    Procedures: Procedure(s):  S/P CYSTOSCOPY, RIGHT URETEROSCOPY, LASER, LITHO AND STENT      Plan:     D/C home today. F/U in OP in 1 week for KUB and stent removal. Office will call with Date and time. Signed By: JOSE MARTIN Maier     February 1, 2023      [facility]    I have reviewed the above note and examined the patient. I agree with the exam, assessment and plan.     Jaleesa Hopkins MD

## 2023-02-03 ENCOUNTER — TELEPHONE (OUTPATIENT)
Dept: UROLOGY | Age: 48
End: 2023-02-03

## 2023-02-03 DIAGNOSIS — N20.1 URETERAL STONE: Primary | ICD-10-CM

## 2023-02-03 RX ORDER — HYDROCODONE BITARTRATE AND ACETAMINOPHEN 5; 325 MG/1; MG/1
1 TABLET ORAL EVERY 6 HOURS PRN
Qty: 12 TABLET | Refills: 0 | Status: SHIPPED | OUTPATIENT
Start: 2023-02-03 | End: 2023-02-06

## 2023-02-03 NOTE — TELEPHONE ENCOUNTER
Pt called and states that her [facility] not [facility] [address] //dh

## 2023-02-03 NOTE — TELEPHONE ENCOUNTER
Diagnosis Orders   1.  Ureteral stone  HYDROcodone-acetaminophen (NORCO) 5-325 MG per tablet        I eprescribed the med

## 2023-02-08 ENCOUNTER — PROCEDURE VISIT (OUTPATIENT)
Dept: UROLOGY | Age: 48
End: 2023-02-08

## 2023-02-08 ENCOUNTER — OFFICE VISIT (OUTPATIENT)
Dept: UROLOGY | Age: 48
End: 2023-02-08

## 2023-02-08 DIAGNOSIS — N20.0 RENAL STONE: ICD-10-CM

## 2023-02-08 DIAGNOSIS — N20.1 RIGHT URETERAL STONE: Primary | ICD-10-CM

## 2023-02-08 DIAGNOSIS — N30.00 ACUTE CYSTITIS WITHOUT HEMATURIA: ICD-10-CM

## 2023-02-08 LAB
BILIRUBIN, URINE, POC: NEGATIVE
BLOOD URINE, POC: NORMAL
GLUCOSE URINE, POC: 100
KETONES, URINE, POC: NEGATIVE
LEUKOCYTE ESTERASE, URINE, POC: NORMAL
NITRITE, URINE, POC: POSITIVE
PH, URINE, POC: 5 (ref 4.6–8)
PROTEIN,URINE, POC: 100
SPECIFIC GRAVITY, URINE, POC: 1.03 (ref 1–1.03)
URINALYSIS CLARITY, POC: NORMAL
URINALYSIS COLOR, POC: NORMAL
UROBILINOGEN, POC: NORMAL

## 2023-02-08 RX ORDER — NITROFURANTOIN 25; 75 MG/1; MG/1
100 CAPSULE ORAL 2 TIMES DAILY
Qty: 14 CAPSULE | Refills: 0 | Status: SHIPPED | OUTPATIENT
Start: 2023-02-08 | End: 2023-02-15

## 2023-02-08 RX ORDER — SULFAMETHOXAZOLE AND TRIMETHOPRIM 800; 160 MG/1; MG/1
1 TABLET ORAL 2 TIMES DAILY
Qty: 14 TABLET | Refills: 0 | Status: CANCELLED | OUTPATIENT
Start: 2023-02-08 | End: 2023-02-15

## 2023-02-08 ASSESSMENT — ENCOUNTER SYMPTOMS
EYE DISCHARGE: 0
SHORTNESS OF BREATH: 0
BACK PAIN: 1
CONSTIPATION: 1
ABDOMINAL PAIN: 1
WHEEZING: 0
HEARTBURN: 1
DIARRHEA: 0
SKIN LESIONS: 0
VOMITING: 0
BLOOD IN STOOL: 0
INDIGESTION: 0
COUGH: 0
EYE PAIN: 0
NAUSEA: 1

## 2023-02-08 NOTE — PROGRESS NOTES
[facility]  [address] [facility]   [address] Jenni Jarrell  [facility], [address] [facility]  [phone]    Angi Levine  : 1975         HPI   41 y.o. female who presents for cystoscopy with R ureter stent removal.     She is s/p R URS/LL with Dr. Amara Tan on 23. Has expected stent discomfort. Strings not visible for removal today. Therefore cysto recommended. Past Medical History:   Diagnosis Date    Acquired hypothyroidism     hyperthyroid    Herpes simplex without mention of complication      Past Surgical History:   Procedure Laterality Date    BLADDER SURGERY Right 2023    CYSTOSCOPY, RIGHT URETEROSCOPY, LASER, LITHO AND STENT performed by Aspen Ramírez MD at [facility] MAIN OR     DELIVERY ONLY       Current Outpatient Medications   Medication Sig Dispense Refill    nitrofurantoin, macrocrystal-monohydrate, (MACROBID) 100 MG capsule Take 1 capsule by mouth 2 times daily for 7 days 14 capsule 0    traMADol (ULTRAM) 50 MG tablet Take 50 mg by mouth every 8 hours as needed. tamsulosin (FLOMAX) 0.4 MG capsule Take 0.4 mg by mouth daily       No current facility-administered medications for this visit.      No Known Allergies  Social History     Socioeconomic History    Marital status:      Spouse name: Not on file    Number of children: Not on file    Years of education: Not on file    Highest education level: Not on file   Occupational History    Not on file   Tobacco Use    Smoking status: Never    Smokeless tobacco: Not on file   Substance and Sexual Activity    Alcohol use: No    Drug use: No    Sexual activity: Not on file   Other Topics Concern    Not on file   Social History Narrative    Not on file     Social Determinants of Health     Financial Resource Strain: Not on file   Food Insecurity: Not on file   Transportation Needs: Not on file   Physical Activity: Not on file   Stress: Not on file   Social Connections: Not on file   Intimate Partner Violence: Not on file Housing Stability: Not on file     Family History   Problem Relation Age of Onset    Diabetes Maternal Grandmother     Cancer Maternal Grandmother     Heart Disease Maternal Grandfather     Hypertension Paternal Grandfather        There were no vitals taken for this visit. UA - Dipstick  Results for orders placed or performed in visit on 02/08/23   AMB POC URINALYSIS DIP STICK AUTO W/O MICRO   Result Value Ref Range    Color (UA POC)      Clarity (UA POC)      Glucose, Urine,  Negative    Bilirubin, Urine, POC Negative Negative    KETONES, Urine, POC Negative Negative    Specific Gravity, Urine, POC 1.030 1.001 - 1.035    Blood (UA POC) Moderate Negative    pH, Urine, POC 5.0 4.6 - 8.0    Protein, Urine,  Negative    Urobilinogen, POC 1 mg/dL     Nitrite, Urine, POC Positive Negative    Leukocyte Esterase, Urine, POC Large Negative       UA - Micro  WBC - 0  RBC - 0  Bacteria - 0  Epith - 0    There were no vitals taken for this visit. GENERAL: No acute distress, Awake, Alert, Oriented X 3, Gait normal  CARDIAC: regular rate and rhythm  CHEST AND LUNG: Easy work of breathing, clear to auscultation bilaterally, no cyanosis  ABDOMEN: soft, non tender, non-distended, positive bowel sounds, no organomegaly, no palpable masses, no guarding, no rebound tenderness  SKIN: No rash, no erythema, no lacerations or abrasions, no ecchymosis  NEUROLOGIC: cranial nerves 2-12 grossly intact       Cystoscopy Procedure:     Procedure Room  1  Scope ID:       DISPO  Assistant:      AUTUMN    All risks, benefits and alternatives were again reviewed with patient and she is willing to proceed at this time. Her genital area was prepped and draped and a sterile field applied. 2% lidocaine jelly was injected in the the urethra and allowed to dwell for several minutes. A flexible cystoscope was then inserted into the urethral meatus and advanced under direct vision. The urethra appeared normal with no obstructions.   The bladder neck was open without scarring, contractures, frons or tumors present. The bladder was systematically surveyed. No bladder trabeculations were seen. No mucosal abnormalities were seen. The ureteral orifices were seen in their normal orthotopic position. Stent seen from R UO. Stent grasper inserted and stent grasped / removed. The cystoscope was then removed under direct vision. The patient tolerated the procedure well. Assessment and Plan    ICD-10-CM    1. Right ureteral stone  N20.1 VT CYSTO W/SIMPLE REMOVAL STONE & STENT        Orders Placed This Encounter   Procedures    VT CYSTO W/SIMPLE REMOVAL STONE & STENT     Right ureter stent removed today completely intact. Patient tolerated well. Follow up PRN    I have educated pt. about diet modifications that can decrease the risk of future calcium stone formation. These include decreasing things high in oxalate such as teas and dillon. Also, I have encouraged pt. to increase clear liquid intake, especially H2O, and decrease dietary sodium intake. Things high in citrate such as lemon juice should also be increased. Jak Starkey M.D.     [facility]  [address]  Phone: [phone]  Fax: [phone]

## 2023-02-08 NOTE — LETTER
[facility]  [address] [facility] [address]  Phone: [phone]  Fax: [phone]    Brigitte Herrmann MD    February 10, 2023     Pema Gregorio, [address] [place] [address]    Patient: Desean Trevizo   MR Number: [id number]   YOB: 1975   Date of Visit: 2/8/2023       Dear Pema Gregorio: Thank you for referring Anand Interiano to me for evaluation/treatment. Below are the relevant portions of my assessment and plan of care. If you have questions, please do not hesitate to call me. I look forward to following Dean Mcdonnell along with you.     Sincerely,      Brigitte Herrmann MD

## 2023-02-08 NOTE — PROGRESS NOTES
[facility]  [address] [facility] [address], [address] [facility]  Kimberlyn Douglas  : 1975    Chief Complaint   Patient presents with    New Patient          HPI     Desean Trevizo is a 41 y.o. female with history of hyperparathyroidism who presented to the hospital 23 for flank pain. She had CT renal stone protocol with evidence of 3 mm right UVJ stone. sCr 23 0.90. S/P right ureteroscopy with holmium laser lithotripsy of ureteral calculus and placement of right double-J ureteral stent 23. Here today, 23, for post op follow up and stent removal. She is having normal irritative stent sx. No fever/chills. Past Medical History:   Diagnosis Date    Acquired hypothyroidism     hyperthyroid    Herpes simplex without mention of complication      Past Surgical History:   Procedure Laterality Date    BLADDER SURGERY Right 2023    CYSTOSCOPY, RIGHT URETEROSCOPY, LASER, LITHO AND STENT performed by Malachi De Guzman MD at [facility] MAIN OR     DELIVERY ONLY       Current Outpatient Medications   Medication Sig Dispense Refill    traMADol (ULTRAM) 50 MG tablet Take 50 mg by mouth every 8 hours as needed. tamsulosin (FLOMAX) 0.4 MG capsule Take 0.4 mg by mouth daily       No current facility-administered medications for this visit.      No Known Allergies  Social History     Socioeconomic History    Marital status:      Spouse name: Not on file    Number of children: Not on file    Years of education: Not on file    Highest education level: Not on file   Occupational History    Not on file   Tobacco Use    Smoking status: Never    Smokeless tobacco: Not on file   Substance and Sexual Activity    Alcohol use: No    Drug use: No    Sexual activity: Not on file   Other Topics Concern    Not on file   Social History Narrative    Not on file     Social Determinants of Health     Financial Resource Strain: Not on file   Food Insecurity: Not on file   Transportation Needs: Not on file   Physical Activity: Not on file   Stress: Not on file   Social Connections: Not on file   Intimate Partner Violence: Not on file   Housing Stability: Not on file     Family History   Problem Relation Age of Onset    Diabetes Maternal Grandmother     Cancer Maternal Grandmother     Heart Disease Maternal Grandfather     Hypertension Paternal Grandfather        Review of Systems  Constitutional:   Negative for fever, chills, appetite change, malaise/fatigue, headaches and weight loss. Skin:  Negative for skin lesions, rash and itching. Eyes:  Negative for visual disturbance, eye pain and eye discharge. ENT:  Negative for difficulty articulating words, pain swallowing, high frequency hearing loss and dry mouth. Respiratory:  Negative for cough, blood in sputum, shortness of breath and wheezing. Cardiovascular:  Negative for chest pain, hypertension, irregular heartbeat, leg pain, leg swelling, regular rate and rhythm and varicose veins. GI: Positive for nausea, abdominal pain, constipation and heartburn. Negative for vomiting, blood in stool, diarrhea and indigestion. Genitourinary: Positive for urinary burning, hematuria, flank pain, history of urolithiasis, nocturia, slower stream, straining, urgency, leakage w/ urge and frequent urination. Negative for recurrent UTIs, incomplete emptying, sexually transmitted disease, menstrual problem, endometriosis and vaginal pain. Musculoskeletal: Positive for back pain, arthralgias and tenderness. Negative for bone pain, muscle weakness and neck pain. Neurological: Positive for numbness. Negative for dizziness, focal weakness, seizures and tremors. Psychological:  Negative for depression and psychiatric problem. Endocrine: Positive for thirst, excessive urination, fatigue and heat intolerance. Negative for cold intolerance.   Hem/Lymphatic:  Negative for easy bleeding, easy bruising and frequent infections. Urinalysis  UA - Dipstick  Results for orders placed or performed in visit on 02/08/23   AMB POC URINALYSIS DIP STICK AUTO W/O MICRO   Result Value Ref Range    Color (UA POC)      Clarity (UA POC)      Glucose, Urine,  Negative    Bilirubin, Urine, POC Negative Negative    KETONES, Urine, POC Negative Negative    Specific Gravity, Urine, POC 1.030 1.001 - 1.035    Blood (UA POC) Moderate Negative    pH, Urine, POC 5.0 4.6 - 8.0    Protein, Urine,  Negative    Urobilinogen, POC 1 mg/dL     Nitrite, Urine, POC Positive Negative    Leukocyte Esterase, Urine, POC Large Negative       PHYSICAL EXAM    General appearance - well appearing and in no distress  Mental status - alert, oriented to person, place, and time  Neck - supple, no significant adenopathy  Chest/Lung-  Quiet, even and easy respiratory effort without use of accessory muscles  Skin - normal coloration and turgor, no rashes    KUB in office today reviewed by myself and shows R ureteral stent in good placement. NO stones. Assessment and Plan    ICD-10-CM    1. Right ureteral stone  N20.1 AMB POC URINALYSIS DIP STICK AUTO W/O MICRO     AMB POC XRAY ABDOMEN 1 VIEW      2. Renal stone  N20.0       3. Acute cystitis without hematuria  N30.00 Culture, Urine          R ureteral stone- resolved. KUB reviwed. No stent string. Will have cysto/stent removal today. Renal stone- no other stones noted on imaging. I have educated pt. about diet modifications that can decrease the risk of future calcium stone formation. These include decreasing things high in oxalate such as teas and dillon. Also, I have encouraged pt. to increase clear liquid intake, especially H2O. Advised the recommended water consumption is 3 liter per day. Things high in citrate such as lemon juice should also be increased. Acute UTI- urine w ?infection. Culture pending. Macrobid 100 mg  PO BID x 7 days sent today. Will contact w culture results. Advised to call sooner if needed. Marilyn Pineda, FNP, APRN - CNP  Dr. Gabi Coronel is supervising physician today and he approves plan of care.

## 2023-02-09 ENCOUNTER — TELEPHONE (OUTPATIENT)
Dept: UROLOGY | Age: 48
End: 2023-02-09

## 2023-02-09 RX ORDER — PHENAZOPYRIDINE HYDROCHLORIDE 200 MG/1
200 TABLET, FILM COATED ORAL 3 TIMES DAILY PRN
Qty: 15 TABLET | Refills: 1 | Status: SHIPPED | OUTPATIENT
Start: 2023-02-09 | End: 2023-02-14

## 2023-02-09 NOTE — TELEPHONE ENCOUNTER
Pt called and states she has dysuria and is asking for something to be called in.   The pt had a cysto with Dr. Cody Forward yesterday //dh

## 2023-02-16 ENCOUNTER — TELEPHONE (OUTPATIENT)
Dept: UROLOGY | Age: 48
End: 2023-02-16

## 2023-02-16 NOTE — TELEPHONE ENCOUNTER
Appointment made for patient tomorrow with Kirk Rosales NP. Need UA and possible KUB.     Cristina Guerin, FNP, APRN - CNP

## 2023-02-16 NOTE — TELEPHONE ENCOUNTER
Pt called and states she is having R sided pain. The pt states she had to get her son to help her out of bed this morning?   Told the pt I would let you know and call her back //dh

## 2023-02-17 ENCOUNTER — TELEPHONE (OUTPATIENT)
Dept: UROLOGY | Age: 48
End: 2023-02-17

## 2023-02-17 NOTE — TELEPHONE ENCOUNTER
Pt stated she was having pain in her back. Pt declined to come in for a ov stating she has a appointment Monday with her Dr and would rather go to that appointment. Pt was advised she could still come in today before 5 and if after 5 to go to the ER if she was having chills/fever.

## 2025-05-15 ENCOUNTER — TELEPHONE (OUTPATIENT)
Dept: UROLOGY | Age: 50
End: 2025-05-15

## 2025-05-15 ENCOUNTER — OFFICE VISIT (OUTPATIENT)
Dept: UROLOGY | Age: 50
End: 2025-05-15
Payer: COMMERCIAL

## 2025-05-15 DIAGNOSIS — N20.0 RENAL STONE: ICD-10-CM

## 2025-05-15 DIAGNOSIS — R31.29 MICROSCOPIC HEMATURIA: Primary | ICD-10-CM

## 2025-05-15 LAB
BILIRUBIN, URINE, POC: NEGATIVE
BLOOD URINE, POC: NORMAL
GLUCOSE URINE, POC: NEGATIVE MG/DL
KETONES, URINE, POC: NORMAL MG/DL
LEUKOCYTE ESTERASE, URINE, POC: NEGATIVE
NITRITE, URINE, POC: NEGATIVE
PH, URINE, POC: 8 (ref 4.6–8)
PROTEIN,URINE, POC: NEGATIVE MG/DL
SPECIFIC GRAVITY, URINE, POC: 1.02 (ref 1–1.03)
URINALYSIS CLARITY, POC: NORMAL
URINALYSIS COLOR, POC: NORMAL
UROBILINOGEN, POC: NORMAL MG/DL

## 2025-05-15 PROCEDURE — 99213 OFFICE O/P EST LOW 20 MIN: CPT | Performed by: NURSE PRACTITIONER

## 2025-05-15 PROCEDURE — 81003 URINALYSIS AUTO W/O SCOPE: CPT | Performed by: NURSE PRACTITIONER

## 2025-05-15 RX ORDER — GABAPENTIN 300 MG/1
300 CAPSULE ORAL 2 TIMES DAILY
COMMUNITY
Start: 2024-08-30

## 2025-05-15 RX ORDER — METFORMIN HYDROCHLORIDE 500 MG/1
1000 TABLET, EXTENDED RELEASE ORAL 2 TIMES DAILY
COMMUNITY
Start: 2025-03-17

## 2025-05-15 RX ORDER — TIRZEPATIDE 7.5 MG/.5ML
INJECTION, SOLUTION SUBCUTANEOUS
COMMUNITY

## 2025-05-15 ASSESSMENT — ENCOUNTER SYMPTOMS
BACK PAIN: 0
NAUSEA: 0

## 2025-05-15 NOTE — PROGRESS NOTES
[facility]  [address] [place] [id number]  [phone]          Arlene Cash  : 1975    Chief Complaint   Patient presents with    Hematuria          HPI     Arlene Cash is a 49 y.o. female with history of hyperparathyroidism who presented to the hospital 23 for flank pain.  She had CT renal stone protocol with evidence of 3 mm right UVJ stone.     S/P right ureteroscopy with holmium laser lithotripsy of ureteral calculus and placement of right double-J ureteral stent 23.     On 23 she had cysto stent removal.    CT a/p renal stone 3/25/25 showed NO stones. Unremarkable.     Referred back for microhematuria. NO gross hematuria.     No personal or family h/o urological CA. Non-smoker.       Past Medical History:   Diagnosis Date    Acquired hypothyroidism     hyperthyroid    Herpes simplex without mention of complication      Past Surgical History:   Procedure Laterality Date    BLADDER SURGERY Right 2023    CYSTOSCOPY, RIGHT URETEROSCOPY, LASER, LITHO AND STENT performed by Rigoberto Bundy Jr., MD at [facility] MAIN OR     DELIVERY ONLY       Current Outpatient Medications   Medication Sig Dispense Refill    MOUNJARO 7.5 MG/0.5ML SOAJ pen INJECT 0.5 ML (7.5 MG) UNDER THE SKIN ONCE WEEKLY ON THE SAME DAY EACH WEEK      gabapentin (NEURONTIN) 300 MG capsule Take 1 capsule by mouth 2 times daily.      metFORMIN (GLUCOPHAGE-XR) 500 MG extended release tablet Take 2 tablets by mouth 2 times daily      traMADol (ULTRAM) 50 MG tablet Take 50 mg by mouth every 8 hours as needed. (Patient not taking: Reported on 5/15/2025)      tamsulosin (FLOMAX) 0.4 MG capsule Take 0.4 mg by mouth daily (Patient not taking: Reported on 5/15/2025)       No current facility-administered medications for this visit.     No Known Allergies  Social History     Socioeconomic History    Marital status:      Spouse name: Not on file    Number of children: Not on file

## 2025-05-15 NOTE — TELEPHONE ENCOUNTER
Pt called in requesting an earlier appt. today. Appt originally scheduled for 3PM but was rescheduled for 10AM.

## 2025-08-12 ENCOUNTER — TELEPHONE (OUTPATIENT)
Dept: UROLOGY | Age: 50
End: 2025-08-12

## (undated) DEVICE — PACK SURGICAL PROCEDURE KIT CYSTOSCOPY TOTE

## (undated) DEVICE — GOWN,REINFORCED,POLY,AURORA,XXLARGE,STR: Brand: MEDLINE

## (undated) DEVICE — GLOVE SURG SZ 8 CRM LTX FREE POLYISOPRENE POLYMER BEAD ANTI

## (undated) DEVICE — GUIDEWIRE ENDOSCP L150CM DIA0.038IN TIP L3CM PTFE FLX STR

## (undated) DEVICE — FLEXIVA  PULSE  AND  FLEXIVA  PULSE  TRACTIP  LASER  FIBERS  ARE  HIGH  POWER  SINGLE-USE FIBER: Brand: FLEXIVA PULSE

## (undated) DEVICE — SOLUTION IRRIG 3000ML H2O STRL BAG